# Patient Record
Sex: FEMALE | Race: OTHER | HISPANIC OR LATINO | Employment: FULL TIME | URBAN - METROPOLITAN AREA
[De-identification: names, ages, dates, MRNs, and addresses within clinical notes are randomized per-mention and may not be internally consistent; named-entity substitution may affect disease eponyms.]

---

## 2023-09-28 ENCOUNTER — HOSPITAL ENCOUNTER (EMERGENCY)
Facility: HOSPITAL | Age: 53
Discharge: HOME | End: 2023-09-28
Attending: EMERGENCY MEDICINE
Payer: COMMERCIAL

## 2023-09-28 ENCOUNTER — APPOINTMENT (EMERGENCY)
Dept: RADIOLOGY | Facility: HOSPITAL | Age: 53
End: 2023-09-28
Payer: COMMERCIAL

## 2023-09-28 VITALS
DIASTOLIC BLOOD PRESSURE: 74 MMHG | SYSTOLIC BLOOD PRESSURE: 152 MMHG | WEIGHT: 184.53 LBS | HEART RATE: 94 BPM | OXYGEN SATURATION: 99 % | RESPIRATION RATE: 18 BRPM | TEMPERATURE: 98.4 F

## 2023-09-28 DIAGNOSIS — S61.211A LACERATION OF LEFT INDEX FINGER WITHOUT FOREIGN BODY WITHOUT DAMAGE TO NAIL, INITIAL ENCOUNTER: Primary | ICD-10-CM

## 2023-09-28 PROCEDURE — 90471 IMMUNIZATION ADMIN: CPT

## 2023-09-28 PROCEDURE — 99284 EMERGENCY DEPT VISIT MOD MDM: CPT

## 2023-09-28 PROCEDURE — 12002 RPR S/N/AX/GEN/TRNK2.6-7.5CM: CPT

## 2023-09-28 PROCEDURE — 99282 EMERGENCY DEPT VISIT SF MDM: CPT

## 2023-09-28 PROCEDURE — 73130 X-RAY EXAM OF HAND: CPT

## 2023-09-28 PROCEDURE — 90715 TDAP VACCINE 7 YRS/> IM: CPT

## 2023-09-28 RX ORDER — LIDOCAINE HYDROCHLORIDE 10 MG/ML
10 INJECTION, SOLUTION EPIDURAL; INFILTRATION; INTRACAUDAL; PERINEURAL ONCE
Status: COMPLETED | OUTPATIENT
Start: 2023-09-28 | End: 2023-09-28

## 2023-09-28 RX ORDER — ACETAMINOPHEN 325 MG/1
650 TABLET ORAL ONCE
Status: COMPLETED | OUTPATIENT
Start: 2023-09-28 | End: 2023-09-28

## 2023-09-28 RX ORDER — FAMOTIDINE 20 MG/1
20 TABLET, FILM COATED ORAL ONCE
Status: COMPLETED | OUTPATIENT
Start: 2023-09-28 | End: 2023-09-28

## 2023-09-28 RX ADMIN — LIDOCAINE HYDROCHLORIDE 10 ML: 10 INJECTION, SOLUTION EPIDURAL; INFILTRATION; INTRACAUDAL at 02:15

## 2023-09-28 RX ADMIN — TETANUS TOXOID, REDUCED DIPHTHERIA TOXOID AND ACELLULAR PERTUSSIS VACCINE, ADSORBED 0.5 ML: 5; 2.5; 8; 8; 2.5 SUSPENSION INTRAMUSCULAR at 02:11

## 2023-09-28 RX ADMIN — FAMOTIDINE 20 MG: 20 TABLET ORAL at 02:29

## 2023-09-28 RX ADMIN — ACETAMINOPHEN 325MG 650 MG: 325 TABLET ORAL at 02:14

## 2023-09-28 NOTE — ED PROVIDER NOTES
History  Chief Complaint   Patient presents with   • Finger Laceration     Pt reports laceration to finger on left hand on glass. 58-year-old female presenting to the ED with complaint of a laceration over the left second digit. Occurred around 11 PM.  States she cut it on a broken glass bottle at work. No concern for a piece of glass being broke off in her finger, states it was a large piece of glass that cut her. Was bleeding which is since been controlled. No pain medications PTA. Still able to move the finger without issue. No other reports of injury or trauma. Believes her last tetanus was in 2017. No other complaints today and reports that she has otherwise been well. [employer] used. None       History reviewed. No pertinent past medical history. History reviewed. No pertinent surgical history. History reviewed. No pertinent family history. I have reviewed and agree with the history as documented. E-Cigarette/Vaping     E-Cigarette/Vaping Substances     Social History     Tobacco Use   • Smoking status: Never   • Smokeless tobacco: Never       Review of Systems   Skin: Positive for wound. All other systems reviewed and are negative. Physical Exam  Physical Exam  Vitals and nursing note reviewed. Constitutional:       General: She is not in acute distress. Appearance: She is well-developed. HENT:      Head: Normocephalic and atraumatic. Eyes:      Conjunctiva/sclera: Conjunctivae normal.   Cardiovascular:      Rate and Rhythm: Normal rate and regular rhythm. Heart sounds: No murmur heard. Pulmonary:      Effort: Pulmonary effort is normal. No respiratory distress. Breath sounds: Normal breath sounds. Abdominal:      Palpations: Abdomen is soft. Tenderness: There is no abdominal tenderness. Musculoskeletal:         General: No swelling. Cervical back: Neck supple.       Comments: ~4 cm slightly gaping, nonbleeding laceration over the left second digit. Appears majority is superficial.  No nail involvement. No exposed tendon, ligaments, vasculature or bone. Patient still able to flex and extend the left second digit without issue. Distal cap refill over the left second digit <2 secs. No other TTP other concerning findings over the left hand. Skin:     General: Skin is warm and dry. Capillary Refill: Capillary refill takes less than 2 seconds. Neurological:      Mental Status: She is alert. Psychiatric:         Mood and Affect: Mood normal.         Vital Signs  ED Triage Vitals   Temperature Pulse Respirations Blood Pressure SpO2   09/28/23 0141 09/28/23 0141 09/28/23 0141 09/28/23 0141 09/28/23 0141   98.4 °F (36.9 °C) 94 18 152/74 99 %      Temp Source Heart Rate Source Patient Position - Orthostatic VS BP Location FiO2 (%)   09/28/23 0141 09/28/23 0141 09/28/23 0141 09/28/23 0141 --   Oral Monitor Sitting Right arm       Pain Score       09/28/23 0143       7           Vitals:    09/28/23 0141   BP: 152/74   Pulse: 94   Patient Position - Orthostatic VS: Sitting         Visual Acuity      ED Medications  Medications   lidocaine (PF) (XYLOCAINE-MPF) 1 % injection 10 mL (10 mL Infiltration Given by Other 9/28/23 0215)   acetaminophen (TYLENOL) tablet 650 mg (650 mg Oral Given 9/28/23 0214)   tetanus-diphtheria-acellular pertussis (BOOSTRIX) IM injection 0.5 mL (0.5 mL Intramuscular Given 9/28/23 0211)   famotidine (PEPCID) tablet 20 mg (20 mg Oral Given 9/28/23 0229)       Diagnostic Studies  Results Reviewed     None                 XR hand 3+ views LEFT   ED Interpretation by Ishan Porter PA-C (09/28 1122)   ED wet read:  No radiopaque retained foreign body or acute osseous abnormality appreciated. Procedures  Universal Protocol:  Consent: Verbal consent obtained.   Risks and benefits: risks, benefits and alternatives were discussed  Consent given by: patient  Patient understanding: patient states understanding of the procedure being performed    Laceration repair    Date/Time: 9/28/2023 4:17 AM    Performed by: Renato Kitchen PA-C  Authorized by: Renato Kitchen PA-C  Body area: upper extremity  Location details: left index finger  Laceration length: 4 cm  Foreign bodies: no foreign bodies  Tendon involvement: none  Nerve involvement: none  Vascular damage: no  Anesthesia: digital block    Anesthesia:  Local Anesthetic: lidocaine 1% without epinephrine  Anesthetic total: 3 mL    Sedation:  Patient sedated: no        Procedure Details:  Preparation: Patient was prepped and draped in the usual sterile fashion. Irrigation solution: saline  Irrigation method: syringe  Amount of cleaning: standard  Debridement: none  Skin closure: 4-0 nylon  Number of sutures: 5  Technique: simple  Approximation: close  Approximation difficulty: simple  Dressing: Xeroform and bandaid   Patient tolerance: patient tolerated the procedure well with no immediate complications               ED Course  ED Course as of 09/28/23 0425   Thu Sep 28, 2023   0317 No radiopaque retained foreign body or acute osseous abnormality appreciated on hand XR, pending official read. 0424 Laceration repaired without issue. Hemostasis achieved. Supportive care and follow-up as outlined in the AVS.  Strict return precautions verbally communicated to the patient as outlined in the AVS.  All patient questions and concerns were answered. Patient verbally communicated their understanding and agreement to the above plan. Patient stable at discharge. Portions of the record may have been created with voice recognition software. Occasional wrong word or "sound a like" substitutions may have occurred due to the inherent limitations of voice recognition software. Read the chart carefully and recognize, using context, where substitutions have occurred.                                                Medical Decision Making  71-year-old female presents ED with complaint of a laceration of the left second digit. Occurred a few hours PTA. Reports there is burning pain and was bleeding however has since been controlled. No other complaints today and reports that she has otherwise been well. PE findings as outlined in the PE section. We will obtain a left hand XR to rule out retained foreign body versus osseous abnormality. Will anesthetize finger and repair with sutures. Will update tetanus. Amount and/or Complexity of Data Reviewed  Radiology: ordered. Risk  OTC drugs. Prescription drug management. Disposition  Final diagnoses:   Laceration of left index finger without foreign body without damage to nail, initial encounter     Time reflects when diagnosis was documented in both MDM as applicable and the Disposition within this note     Time User Action Codes Description Comment    9/28/2023  4:15 AM eJff Fowler Add [Z42.958T] Laceration of left index finger without foreign body without damage to nail, initial encounter       ED Disposition     ED Disposition   Discharge    Condition   Stable    Date/Time   Thu Sep 28, 2023  4:15 AM    Comment   Jeferson Hollins discharge to home/self care. Follow-up Information     Follow up With Specialties Details Why Contact Info Additional [facility] Emergency Department Emergency Medicine Go to  As needed, If symptoms worsen [address] [id number]  [address] [facility] Emergency Department, [address], [address]          There are no discharge medications for this patient. No discharge procedures on file.     PDMP Review     None          ED Provider  Electronically Signed by           Glo Cassidy PA-C  09/28/23 0425

## 2023-09-28 NOTE — DISCHARGE INSTRUCTIONS
Regrese al departamento de emergencias si tiene alguna inquietud, sandro se describe en el resumen posterior a la visita, o si tiene cualquier otra inquietud. Gilbert un seguimiento con garcia proveedor de atención primaria en 2 días para howei reevaluación y un tratamiento adicional.    Continúe manteniendo la herida limpia y seca. Cambie el vendaje al menos 2 veces al día y si el vendaje se ensucia cámbielo con más frecuencia. Tirar material amarillo después del primer cambio de apósito. Continúe con ibuprofeno o acetaminofén según sea necesario para controlar el dolor. Puede continuar con bacitracina de venta zuri, triple antibiótico o Neosporin sobre la herida [employer] sane. Las suturas deben retirarse en 10 a 15 días.

## 2023-09-28 NOTE — Clinical Note
Jeanne Ambrose was seen and treated in our emergency department on 9/28/2023. Diagnosis:     Zena  . She may return on this date: 10/05/2023         If you have any questions or concerns, please don't hesitate to call.       Kalee Caceres PA-C    ______________________________           _______________          _______________  Hospital Representative                              Date                                Time

## 2023-10-17 ENCOUNTER — HOSPITAL ENCOUNTER (EMERGENCY)
Facility: HOSPITAL | Age: 53
Discharge: HOME | End: 2023-10-17
Attending: EMERGENCY MEDICINE | Admitting: EMERGENCY MEDICINE
Payer: COMMERCIAL

## 2023-10-17 ENCOUNTER — APPOINTMENT (EMERGENCY)
Dept: RADIOLOGY | Facility: HOSPITAL | Age: 53
End: 2023-10-17
Payer: COMMERCIAL

## 2023-10-17 VITALS
DIASTOLIC BLOOD PRESSURE: 60 MMHG | SYSTOLIC BLOOD PRESSURE: 131 MMHG | TEMPERATURE: 98.1 F | RESPIRATION RATE: 16 BRPM | HEART RATE: 83 BPM | OXYGEN SATURATION: 99 %

## 2023-10-17 DIAGNOSIS — S93.601A SPRAIN OF RIGHT FOOT, INITIAL ENCOUNTER: Primary | ICD-10-CM

## 2023-10-17 PROCEDURE — 73630 X-RAY EXAM OF FOOT: CPT

## 2023-10-17 RX ORDER — NAPROXEN 250 MG/1
250 TABLET ORAL
Qty: 21 TABLET | Refills: 0 | Status: SHIPPED | OUTPATIENT
Start: 2023-10-17 | End: 2023-10-24

## 2023-10-17 RX ORDER — LIDOCAINE 40 MG/G
CREAM TOPICAL AS NEEDED
Qty: 30 G | Refills: 0 | Status: SHIPPED | OUTPATIENT
Start: 2023-10-17

## 2023-10-17 RX ORDER — LIDOCAINE 50 MG/G
1 PATCH TOPICAL ONCE
Status: DISCONTINUED | OUTPATIENT
Start: 2023-10-17 | End: 2023-10-17 | Stop reason: HOSPADM

## 2023-10-17 RX ADMIN — LIDOCAINE 1 PATCH: 700 PATCH TOPICAL at 21:11

## 2023-10-17 NOTE — Clinical Note
Zita Blanton was seen and treated in our emergency department on 10/17/2023. Diagnosis:     Thelma Sadler  may return to work on return date. She may return on this date: 10/19/2023         If you have any questions or concerns, please don't hesitate to call.       Michelle Tinajero MD    ______________________________           _______________          _______________  Hospital Representative                              Date                                Time

## 2023-10-18 NOTE — ED PROVIDER NOTES
History  Chief Complaint   Patient presents with    Foot Pain     Pt with R foot pain. Pt with pain for 8 days. Pt denies any injury. Pt reports pain from toes to ankle. Pt took diclofenac        History provided by:  Patient and significant other   used: Yes    Foot Laceration  Pain details:     Quality:  Dull    Severity:  Moderate    Timing:  Constant (x 1 week)    Progression:  Worsening  Relieved by:  Nothing  Worsened by:  Nothing  Ineffective treatments:  None tried  Associated symptoms: no fever, no focal weakness, no numbness and no rash        None       No past medical history on file. No past surgical history on file. No family history on file. I have reviewed and agree with the history as documented. E-Cigarette/Vaping     E-Cigarette/Vaping Substances     Social History     Tobacco Use    Smoking status: Never    Smokeless tobacco: Never       Review of Systems   Constitutional:  Negative for activity change, appetite change, fatigue and fever. HENT:  Negative for congestion, dental problem, ear pain, rhinorrhea and sore throat. Eyes:  Negative for pain and redness. Respiratory:  Negative for chest tightness, shortness of breath and wheezing. Cardiovascular:  Negative for chest pain and palpitations. Gastrointestinal:  Negative for abdominal pain, blood in stool, constipation, diarrhea, nausea and vomiting. Endocrine: Negative for cold intolerance and heat intolerance. Genitourinary:  Negative for dysuria, frequency and hematuria. Musculoskeletal:  Negative for arthralgias and myalgias. Skin:  Negative for color change, pallor and rash. Neurological:  Negative for focal weakness, weakness and numbness. Hematological:  Does not bruise/bleed easily. Psychiatric/Behavioral:  Negative for agitation, hallucinations and suicidal ideas. Physical Exam  Physical Exam  Vitals and nursing note reviewed.    Constitutional:       Appearance: She is well-developed. HENT:      Mouth/Throat:      Pharynx: No oropharyngeal exudate. Eyes:      Conjunctiva/sclera: Conjunctivae normal.   Neck:      Comments: No meningeal signs  Cardiovascular:      Rate and Rhythm: Normal rate and regular rhythm. Heart sounds: Normal heart sounds. Pulmonary:      Effort: Pulmonary effort is normal. No respiratory distress. Breath sounds: Normal breath sounds. No wheezing or rales. Chest:      Chest wall: No tenderness. Abdominal:      General: Bowel sounds are normal. There is no distension. Palpations: Abdomen is soft. There is no mass. Tenderness: There is no abdominal tenderness. Hernia: No hernia is present. Comments: No cvat   Musculoskeletal:         General: Normal range of motion. Cervical back: Normal range of motion and neck supple. Comments: R foot diffusely ttp, no swelling/redness/warmth, nvi   Lymphadenopathy:      Cervical: No cervical adenopathy. Skin:     Findings: No bruising, erythema, lesion or rash. Comments: No edema   Neurological:      Mental Status: She is alert and oriented to person, place, and time. Cranial Nerves: No cranial nerve deficit.    Psychiatric:         Behavior: Behavior normal.         Vital Signs  ED Triage Vitals [10/17/23 2022]   Temperature Pulse Respirations Blood Pressure SpO2   98.1 °F (36.7 °C) 83 16 131/60 99 %      Temp src Heart Rate Source Patient Position - Orthostatic VS BP Location FiO2 (%)   -- -- Sitting Right arm --      Pain Score       8           Vitals:    10/17/23 2022   BP: 131/60   Pulse: 83   Patient Position - Orthostatic VS: Sitting         Visual Acuity      ED Medications  Medications   lidocaine (LIDODERM) 5 % patch 1 patch (has no administration in time range)       Diagnostic Studies  Results Reviewed       None                   XR foot 3+ views RIGHT   ED Interpretation by Michelle Tinajero MD (10/17 2052)   Primary reviewed: no acute abnormality Procedures  Procedures         ED Course                                             Medical Decision Making  R foot pain w/o hx/exam findings suggesting infectious etiology/gout-will do xray to r/o fx dislocation, tx symptoms, podiatry f/u    Amount and/or Complexity of Data Reviewed  Radiology: ordered and independent interpretation performed. Risk  OTC drugs. Prescription drug management. Disposition  Final diagnoses:   Sprain of right foot, initial encounter     Time reflects when diagnosis was documented in both MDM as applicable and the Disposition within this note       Time User Action Codes Description Comment    10/17/2023  9:07 PM Vignesh Sparrow Add [O28.942T] Sprain of right foot, initial encounter           ED Disposition       ED Disposition   Discharge    Condition   Stable    Date/Time   Tue Oct 17, 2023  9:07 PM    Comment   Lauren Eye discharge to home/self care. Follow-up Information       Follow up With Specialties Details Why Contact Info    Shen Auguste DPM Podiatry, Wound Care Schedule an appointment as soon as possible for a visit in 2 days  [address] [place] [address]  [phone]              Patient's Medications   Discharge Prescriptions    LIDOCAINE (LMX) 4 % CREAM    Apply topically as needed for moderate pain       Start Date: 10/17/2023End Date: --       Order Dose: --       Quantity: 30 g    Refills: 0    NAPROXEN (NAPROSYN) 250 MG TABLET    Take 1 tablet (250 mg total) by mouth 3 (three) times a day with meals for 7 days       Start Date: 10/17/2023End Date: 10/24/2023       Order Dose: 250 mg       Quantity: 21 tablet    Refills: 0       No discharge procedures on file.     PDMP Review       None            ED Provider  Electronically Signed by             Jacques Velasquez MD  10/17/23 0235